# Patient Record
Sex: FEMALE | Race: WHITE
[De-identification: names, ages, dates, MRNs, and addresses within clinical notes are randomized per-mention and may not be internally consistent; named-entity substitution may affect disease eponyms.]

---

## 2017-01-27 ENCOUNTER — HOSPITAL ENCOUNTER (OUTPATIENT)
Dept: HOSPITAL 80 - FOBOP | Age: 42
Discharge: HOME | End: 2017-01-27
Attending: OBSTETRICS & GYNECOLOGY
Payer: MEDICAID

## 2017-01-27 DIAGNOSIS — O02.1: Primary | ICD-10-CM

## 2017-01-27 PROCEDURE — 10D18ZZ EXTRACTION OF PRODUCTS OF CONCEPTION, RETAINED, VIA NATURAL OR ARTIFICIAL OPENING ENDOSCOPIC: ICD-10-PCS | Performed by: OBSTETRICS & GYNECOLOGY

## 2017-01-27 NOTE — SUROPNOTE
FRANCOIS Operative Report





- Surgery


Ob/Gyn Procedure Note


17





Procedure: suction D&C under ultrasound guidance


Preoperative diagnosis: embryonic demise at 6w2d


Postoperative diagnosis: Same


Anesthesia: MAC


Anesthesiologist: Bailey


Surgeon: Kiera


EBL: Minimal


Specimen: Products of conception


Findings: Small anteverted uterus. POC consistent with gestational age. Thin 

endometrial stripe at end of procedure. 


Complications: None


Outcome: Stable, to PACU





Indications: Patient is a 41 year old  with a missed  measuring 

6w2d. She desires surgical management. She is Rh+. She declines genetic 

testing. 





Details of procedure: Patient was brought to the operating room and MAC 

anesthesia was induced. she was prepped and draped in the normal sterile 

fashion in dorsal lithotomy with selvin stirrups. Her bladder had already been 

emptied. A time out was performed. The speculum was placed and the anterior lip 

of the cervix was grasped with a single toothed tenaculum. A paracervical block 

was performed with 10 ml 1% plain lidocaine. The cervix was sequentially 

dilated to 8 mm using hegar dilators. The 7 mm curette was advanced into the 

uterus under direct ultrasound guidance and the uterus was emptied completely. 

A gritty texture was noted throughout the uterus. All instruments were removed 

and hemostasis was noted. The products were examined and the gestational sac 

was identified. The patient was awakened and brought to the PACU in good 

condition. She was advised to follow pelvic rest for 2 weeks. Routine 

precautions were discussed. She will follow-up in the office in 2 weeks. 





Lyssa Qureshi MD

## 2017-02-28 ENCOUNTER — HOSPITAL ENCOUNTER (OUTPATIENT)
Dept: HOSPITAL 80 - FIMAGING | Age: 42
End: 2017-02-28
Attending: OBSTETRICS & GYNECOLOGY
Payer: MEDICAID

## 2017-02-28 DIAGNOSIS — Z31.69: Primary | ICD-10-CM

## 2017-02-28 DIAGNOSIS — N04.1: ICD-10-CM

## 2018-11-12 ENCOUNTER — HOSPITAL ENCOUNTER (OUTPATIENT)
Dept: HOSPITAL 80 - FIMAGING | Age: 43
End: 2018-11-12
Attending: OBSTETRICS & GYNECOLOGY
Payer: COMMERCIAL

## 2018-11-12 DIAGNOSIS — Z3A.20: ICD-10-CM

## 2018-11-12 DIAGNOSIS — O09.522: Primary | ICD-10-CM

## 2018-11-12 DIAGNOSIS — O44.22: ICD-10-CM

## 2018-12-10 ENCOUNTER — HOSPITAL ENCOUNTER (OUTPATIENT)
Dept: HOSPITAL 80 - FIMAGING | Age: 43
End: 2018-12-10
Attending: OBSTETRICS & GYNECOLOGY
Payer: COMMERCIAL

## 2018-12-10 DIAGNOSIS — Z3A.20: ICD-10-CM

## 2018-12-10 DIAGNOSIS — O09.522: Primary | ICD-10-CM

## 2019-01-29 ENCOUNTER — HOSPITAL ENCOUNTER (OUTPATIENT)
Dept: HOSPITAL 80 - FIMAGING | Age: 44
End: 2019-01-29
Attending: OBSTETRICS & GYNECOLOGY
Payer: COMMERCIAL

## 2019-01-29 DIAGNOSIS — O09.523: Primary | ICD-10-CM

## 2019-01-29 DIAGNOSIS — Z3A.31: ICD-10-CM
